# Patient Record
Sex: FEMALE | Race: BLACK OR AFRICAN AMERICAN | NOT HISPANIC OR LATINO | URBAN - METROPOLITAN AREA
[De-identification: names, ages, dates, MRNs, and addresses within clinical notes are randomized per-mention and may not be internally consistent; named-entity substitution may affect disease eponyms.]

---

## 2017-03-29 ENCOUNTER — OUTPATIENT (OUTPATIENT)
Dept: OUTPATIENT SERVICES | Facility: HOSPITAL | Age: 57
LOS: 1 days | Discharge: HOME | End: 2017-03-29

## 2017-06-27 DIAGNOSIS — M19.90 UNSPECIFIED OSTEOARTHRITIS, UNSPECIFIED SITE: ICD-10-CM

## 2018-01-13 ENCOUNTER — HOSPITAL ENCOUNTER (EMERGENCY)
Facility: HOSPITAL | Age: 58
End: 2018-01-13
Attending: EMERGENCY MEDICINE | Admitting: EMERGENCY MEDICINE
Payer: COMMERCIAL

## 2018-01-13 VITALS
SYSTOLIC BLOOD PRESSURE: 142 MMHG | RESPIRATION RATE: 16 BRPM | OXYGEN SATURATION: 99 % | HEART RATE: 82 BPM | WEIGHT: 183 LBS | TEMPERATURE: 97.8 F | DIASTOLIC BLOOD PRESSURE: 95 MMHG

## 2018-01-13 DIAGNOSIS — Z86.59 HISTORY OF COMMAND HALLUCINATIONS: ICD-10-CM

## 2018-01-13 DIAGNOSIS — F32.A DEPRESSION: Primary | ICD-10-CM

## 2018-01-13 LAB
ALBUMIN SERPL BCP-MCNC: 3.4 G/DL (ref 3.5–5)
ALP SERPL-CCNC: 98 U/L (ref 46–116)
ALT SERPL W P-5'-P-CCNC: 32 U/L (ref 12–78)
AMPHETAMINES SERPL QL SCN: NEGATIVE
ANION GAP SERPL CALCULATED.3IONS-SCNC: 7 MMOL/L (ref 4–13)
AST SERPL W P-5'-P-CCNC: 19 U/L (ref 5–45)
BACTERIA UR QL AUTO: ABNORMAL /HPF
BARBITURATES UR QL: NEGATIVE
BASOPHILS # BLD AUTO: 0.1 THOUSANDS/ΜL (ref 0–0.1)
BASOPHILS NFR BLD AUTO: 1 % (ref 0–1)
BENZODIAZ UR QL: NEGATIVE
BILIRUB SERPL-MCNC: 0.4 MG/DL (ref 0.2–1)
BILIRUB UR QL STRIP: NEGATIVE
BUN SERPL-MCNC: 12 MG/DL (ref 5–25)
CALCIUM SERPL-MCNC: 9.3 MG/DL (ref 8.3–10.1)
CHLORIDE SERPL-SCNC: 103 MMOL/L (ref 100–108)
CLARITY UR: CLEAR
CO2 SERPL-SCNC: 31 MMOL/L (ref 21–32)
COCAINE UR QL: NEGATIVE
COLOR UR: YELLOW
CREAT SERPL-MCNC: 1.24 MG/DL (ref 0.6–1.3)
EOSINOPHIL # BLD AUTO: 0.3 THOUSAND/ΜL (ref 0–0.61)
EOSINOPHIL NFR BLD AUTO: 3 % (ref 0–6)
ERYTHROCYTE [DISTWIDTH] IN BLOOD BY AUTOMATED COUNT: 13.5 % (ref 11.6–15.1)
ETHANOL EXG-MCNC: 0 MG/DL
GFR SERPL CREATININE-BSD FRML MDRD: 57 ML/MIN/1.73SQ M
GLUCOSE SERPL-MCNC: 124 MG/DL (ref 65–140)
GLUCOSE UR STRIP-MCNC: NEGATIVE MG/DL
HCT VFR BLD AUTO: 43.6 % (ref 34.8–46.1)
HGB BLD-MCNC: 14.7 G/DL (ref 11.5–15.4)
HGB UR QL STRIP.AUTO: ABNORMAL
KETONES UR STRIP-MCNC: NEGATIVE MG/DL
LEUKOCYTE ESTERASE UR QL STRIP: NEGATIVE
LYMPHOCYTES # BLD AUTO: 4.94 THOUSANDS/ΜL (ref 0.6–4.47)
LYMPHOCYTES NFR BLD AUTO: 43 % (ref 14–44)
MCH RBC QN AUTO: 29.4 PG (ref 26.8–34.3)
MCHC RBC AUTO-ENTMCNC: 33.7 G/DL (ref 31.4–37.4)
MCV RBC AUTO: 87 FL (ref 82–98)
METHADONE UR QL: NEGATIVE
MONOCYTES # BLD AUTO: 0.49 THOUSAND/ΜL (ref 0.17–1.22)
MONOCYTES NFR BLD AUTO: 4 % (ref 4–12)
NEUTROPHILS # BLD AUTO: 5.6 THOUSANDS/ΜL (ref 1.85–7.62)
NEUTS SEG NFR BLD AUTO: 49 % (ref 43–75)
NITRITE UR QL STRIP: NEGATIVE
NON-SQ EPI CELLS URNS QL MICRO: ABNORMAL /HPF
NRBC BLD AUTO-RTO: 0 /100 WBCS
OPIATES UR QL SCN: NEGATIVE
PCP UR QL: NEGATIVE
PH UR STRIP.AUTO: 5.5 [PH] (ref 4.5–8)
PLATELET # BLD AUTO: 366 THOUSANDS/UL (ref 149–390)
PMV BLD AUTO: 9.9 FL (ref 8.9–12.7)
POTASSIUM SERPL-SCNC: 4 MMOL/L (ref 3.5–5.3)
PROT SERPL-MCNC: 7.9 G/DL (ref 6.4–8.2)
PROT UR STRIP-MCNC: NEGATIVE MG/DL
RBC # BLD AUTO: 5 MILLION/UL (ref 3.81–5.12)
RBC #/AREA URNS AUTO: ABNORMAL /HPF
SODIUM SERPL-SCNC: 141 MMOL/L (ref 136–145)
SP GR UR STRIP.AUTO: 1.01 (ref 1–1.03)
T4 FREE SERPL-MCNC: 0.87 NG/DL (ref 0.76–1.46)
THC UR QL: NEGATIVE
TSH SERPL DL<=0.05 MIU/L-ACNC: 0.33 UIU/ML (ref 0.36–3.74)
UROBILINOGEN UR QL STRIP.AUTO: 0.2 E.U./DL
WBC # BLD AUTO: 11.43 THOUSAND/UL (ref 4.31–10.16)
WBC #/AREA URNS AUTO: ABNORMAL /HPF

## 2018-01-13 PROCEDURE — 84439 ASSAY OF FREE THYROXINE: CPT | Performed by: EMERGENCY MEDICINE

## 2018-01-13 PROCEDURE — 82075 ASSAY OF BREATH ETHANOL: CPT | Performed by: EMERGENCY MEDICINE

## 2018-01-13 PROCEDURE — 36415 COLL VENOUS BLD VENIPUNCTURE: CPT | Performed by: EMERGENCY MEDICINE

## 2018-01-13 PROCEDURE — 80053 COMPREHEN METABOLIC PANEL: CPT | Performed by: EMERGENCY MEDICINE

## 2018-01-13 PROCEDURE — 93005 ELECTROCARDIOGRAM TRACING: CPT

## 2018-01-13 PROCEDURE — 81001 URINALYSIS AUTO W/SCOPE: CPT

## 2018-01-13 PROCEDURE — 84443 ASSAY THYROID STIM HORMONE: CPT | Performed by: EMERGENCY MEDICINE

## 2018-01-13 PROCEDURE — 81002 URINALYSIS NONAUTO W/O SCOPE: CPT | Performed by: EMERGENCY MEDICINE

## 2018-01-13 PROCEDURE — 99285 EMERGENCY DEPT VISIT HI MDM: CPT

## 2018-01-13 PROCEDURE — 93005 ELECTROCARDIOGRAM TRACING: CPT | Performed by: EMERGENCY MEDICINE

## 2018-01-13 PROCEDURE — 85025 COMPLETE CBC W/AUTO DIFF WBC: CPT | Performed by: EMERGENCY MEDICINE

## 2018-01-13 PROCEDURE — 80307 DRUG TEST PRSMV CHEM ANLYZR: CPT | Performed by: EMERGENCY MEDICINE

## 2018-01-13 NOTE — ED NOTES
201 reviewed and signed by Pt for inpatient treatment  Dr Roxanna Granados is in agreement with treatment plan

## 2018-01-13 NOTE — ED NOTES
Pt has been accepted to [facility] by admissions Bettye and Delmer Morataya       S[place]  745 pm

## 2018-01-13 NOTE — ED NOTES
Chief Complaint   Patient presents with   [facility]     Pt  reports increased depression x months, recently moved here from [place]  Reports SI without a plan  Has been off meds x months, ran out of them  Denies HI/AH/VH  CW completed Behavioral health evaluation and Risk assessment for 56year old Female who  presents to the ED due to worsening depression and command AH to harm self, no specfied plan  Pt reports depression and AH have worsened past three days  Pt reports a recent argument with friend as a trigger for depression  Pt denies VH and HI  Hx of suicide attempts, last being at age 41: attmepted to turn on gas in home  Poor sleep and appetite  Pt resides with sister, moved two months ago from [facility], and is able to return home  No current mental health supports or medication  Last saw a psychiatrist 2 months ago in [facility]  Hx of cocaine abuse, last use 2 years ago  Pt denies current drug or alcohol use  Pt is in agreement to sign self in for inpatient treatment

## 2018-01-13 NOTE — ED PROVIDER NOTES
History  Chief Complaint   Patient presents with    Psychiatric Evaluation     Pt  reports increased depression x months, recently moved here from [place]  Reports SI without a plan  Has been off meds x months, ran out of them  Denies HI/AH/VH  Pt here for depression  Off her meds for a few months  Pt states sometimes she "doesn't want to be here but I wouldn't kill myself "  Pt is asking for outpatient resources, "I don't need to be admitted "        History provided by:  Patient   used: No    Psychiatric Evaluation   Presenting symptoms: depression    Presenting symptoms: no agitation, no hallucinations, no self-mutilation and no suicidal thoughts    Duration:  3 days  Timing:  Constant  Progression:  Unchanged  Chronicity:  Recurrent  Context: noncompliance    Context: not drug abuse    Treatment compliance:  Untreated  Relieved by:  None tried  Worsened by:  Nothing  Ineffective treatments:  None tried  Associated symptoms: no anxiety and no headaches        None       Past Medical History:   Diagnosis Date    Asthma     Depression     Psychosis        History reviewed  No pertinent surgical history  History reviewed  No pertinent family history  I have reviewed and agree with the history as documented  Social History   Substance Use Topics    Smoking status: Current Every Day Smoker    Smokeless tobacco: Never Used    Alcohol use No        Review of Systems   Constitutional: Negative for chills and fever  Eyes: Negative for visual disturbance  Gastrointestinal: Negative for diarrhea, nausea and vomiting  Skin: Negative for rash  Neurological: Negative for tremors, facial asymmetry, speech difficulty, weakness, numbness and headaches  Psychiatric/Behavioral: Negative for agitation, behavioral problems, confusion, decreased concentration, dysphoric mood, hallucinations, self-injury, sleep disturbance and suicidal ideas   The patient is not nervous/anxious and is not hyperactive  All other systems reviewed and are negative  Physical Exam  ED Triage Vitals [01/13/18 1053]   Temperature Pulse Respirations Blood Pressure SpO2   98 7 °F (37 1 °C) 89 18 (!) 180/99 97 %      Temp Source Heart Rate Source Patient Position - Orthostatic VS BP Location FiO2 (%)   Temporal -- -- -- --      Pain Score       No Pain           Orthostatic Vital Signs  Vitals:    01/13/18 1053   BP: (!) 180/99   Pulse: 89       Physical Exam   Constitutional: She is oriented to person, place, and time  She appears well-developed and well-nourished  No distress  HENT:   Head: Normocephalic  Eyes: EOM are normal    Neck: Normal range of motion  Neck supple  Cardiovascular: Normal rate, regular rhythm, normal heart sounds and intact distal pulses  Exam reveals no gallop and no friction rub  No murmur heard  Pulmonary/Chest: Effort normal and breath sounds normal  No respiratory distress  She has no wheezes  She has no rales  Abdominal: Soft  Bowel sounds are normal  She exhibits no distension  There is no tenderness  There is no rebound and no guarding  Neurological: She is alert and oriented to person, place, and time  Skin: Skin is warm and dry  No pallor  Psychiatric: Her affect is not angry, not blunt, not labile and not inappropriate  Her speech is not rapid and/or pressured and not slurred  She is not agitated, not aggressive, not hyperactive and not slowed  She exhibits a depressed mood  She expresses no homicidal ideation  She expresses no suicidal plans and no homicidal plans  Tearful, pleasant   Nursing note and vitals reviewed        ED Medications  Medications - No data to display    Diagnostic Studies  Results Reviewed     Procedure Component Value Units Date/Time    Urine Microscopic [[id number]]  (Abnormal) Collected:  01/13/18 1532    Lab Status:  Final result Specimen:  Urine from Urine, Clean Catch Updated:  01/13/18 1554     RBC, UA 2-4 (A) /hpf      WBC, UA None Seen /hpf      Epithelial Cells Occasional /hpf      Bacteria, UA Occasional /hpf     POCT urinalysis dipstick [[id number]]  (Abnormal) Resulted:  01/13/18 1532    Lab Status:  Final result Specimen:  Urine Updated:  01/13/18 1537    ED Urine Macroscopic [[id number]]  (Abnormal) Collected:  01/13/18 1532    Lab Status:  Final result Specimen:  Urine Updated:  01/13/18 1533     Color, UA Yellow     Clarity, UA Clear     pH, UA 5 5     Leukocytes, UA Negative     Nitrite, UA Negative     Protein, UA Negative mg/dl      Glucose, UA Negative mg/dl      Ketones, UA Negative mg/dl      Urobilinogen, UA 0 2 E U /dl      Bilirubin, UA Negative     Blood, UA Moderate (A)     Specific [place], UA 1 015    Narrative:       CLINITEK RESULT    TSH, 3rd generation with T4 reflex [[id number]]  (Abnormal) Collected:  01/13/18 1414    Lab Status:  Final result Specimen:  Blood from Arm, Left Updated:  01/13/18 1445     TSH 3RD GENERATON 0 326 (L) uIU/mL     Narrative:         Patients undergoing fluorescein dye angiography may retain small amounts of fluorescein in the body for 48-72 hours post procedure  Samples containing fluorescein can produce falsely depressed TSH values  If the patient had this procedure,a specimen should be resubmitted post fluorescein clearance  The recommended reference ranges for TSH during pregnancy are as follows:  First trimester 0 1 to 2 5 uIU/mL  Second trimester  0 2 to 3 0 uIU/mL  Third trimester 0 3 to 3 0 uIU/m      T4, free [[id number]] Collected:  01/13/18 1414    Lab Status:   In process Specimen:  Blood from Arm, Left Updated:  01/13/18 1445    Comprehensive metabolic panel [[id number]]  (Abnormal) Collected:  01/13/18 1414    Lab Status:  Final result Specimen:  Blood from Arm, Left Updated:  01/13/18 1441     Sodium 141 mmol/L      Potassium 4 0 mmol/L      Chloride 103 mmol/L      CO2 31 mmol/L      Anion Gap 7 mmol/L      BUN 12 mg/dL      Creatinine 1 24 mg/dL      Glucose 124 mg/dL      Calcium 9 3 mg/dL      AST 19 U/L      ALT 32 U/L      Alkaline Phosphatase 98 U/L      Total Protein 7 9 g/dL      Albumin 3 4 (L) g/dL      Total Bilirubin 0 40 mg/dL      eGFR 57 ml/min/1 73sq m     Narrative:         National Kidney Disease Education Program recommendations are as follows:  GFR calculation is accurate only with a steady state creatinine  Chronic Kidney disease less than 60 ml/min/1 73 sq  meters  Kidney failure less than 15 ml/min/1 73 sq  meters  CBC and differential [[id number]]  (Abnormal) Collected:  01/13/18 1414    Lab Status:  Final result Specimen:  Blood from Arm, Left Updated:  01/13/18 1421     WBC 11 43 (H) Thousand/uL      RBC 5 00 Million/uL      Hemoglobin 14 7 g/dL      Hematocrit 43 6 %      MCV 87 fL      MCH 29 4 pg      MCHC 33 7 g/dL      RDW 13 5 %      MPV 9 9 fL      Platelets 178 Thousands/uL      nRBC 0 /100 WBCs      Neutrophils Relative 49 %      Lymphocytes Relative 43 %      Monocytes Relative 4 %      Eosinophils Relative 3 %      Basophils Relative 1 %      Neutrophils Absolute 5 60 Thousands/µL      Lymphocytes Absolute 4 94 (H) Thousands/µL      Monocytes Absolute 0 49 Thousand/µL      Eosinophils Absolute 0 30 Thousand/µL      Basophils Absolute 0 10 Thousands/µL     Rapid drug screen, urine [[id number]]  (Normal) Collected:  01/13/18 1127    Lab Status:  Final result Specimen:  Urine from Urine, Clean Catch Updated:  01/13/18 1150     Amph/Meth UR Negative     Barbiturate Ur Negative     Benzodiazepine Urine Negative     Cocaine Urine Negative     Methadone Urine Negative     Opiate Urine Negative     PCP Ur Negative     THC Urine Negative    Narrative:         FOR MEDICAL PURPOSES ONLY  IF CONFIRMATION NEEDED PLEASE CONTACT THE LAB WITHIN 5 DAYS      Drug Screen Cutoff Levels:  AMPHETAMINE/METHAMPHETAMINES  1000 ng/mL  BARBITURATES     200 ng/mL  BENZODIAZEPINES     200 ng/mL  COCAINE      300 ng/mL  METHADONE      300 ng/mL  OPIATES      300 ng/mL  PHENCYCLIDINE     25 ng/mL  THC       50 ng/mL    POCT alcohol breath test [[id number]]  (Normal) Resulted:  01/13/18 1125    Lab Status:  Final result Updated:  01/13/18 1125     EXTBreath Alcohol 0 000                 No orders to display              Procedures  ECG 12 Lead Documentation  Date/Time: 1/13/2018 2:29 PM  Performed by: Lisette De León by: Linwood Owsald     Indications / Diagnosis:  Psych eval  ECG reviewed by me, the ED Provider: yes    Patient location:  Bedside  Rate:     ECG rate:  74    ECG rate assessment: normal    Rhythm:     Rhythm: sinus rhythm    Ectopy:     Ectopy: none    QRS:     QRS intervals:  Normal  ST segments:     ST segments:  Normal  T waves:     T waves: normal             Phone Contacts  ED Phone Contact    ED Course  ED Course as of Jan 13 1557   Sat Jan 13, 2018   1403 Pt now admitting to crisis worker that she is having command hallucinations, which she denies to me during my initial assessment  Pt signed 201 and anson-psych labs ordered  MDM  Number of Diagnoses or Management Options  Diagnosis management comments: Depression - Crisis c/s         Amount and/or Complexity of Data Reviewed  Tests in the medicine section of CPT®: reviewed and ordered      CritCare Time    Disposition  Final diagnoses:   Depression   History of command hallucinations     Time reflects when diagnosis was documented in both MDM as applicable and the Disposition within this note     Time User Action Codes Description Comment    1/13/2018  1:02 PM Wilberto Sewell [F32 9] Depression     1/13/2018  3:49 PM Wilberto Sewell [Z86 59] History of command hallucinations       ED Disposition     ED Disposition Condition Comment    Transfer to [address] [place] [address] Most Recent Value   Patient Condition  The patient has been stabilized such that within reasonable medical probability, no material deterioration of the patient condition or the condition of the unborn child(leslie) is likely to result from the transfer   Reason for Transfer  Level of Care needed not available at this facility   Benefits of Transfer  Specialized equipment and/or services available at the receiving facility (Include comment)________________________   Risks of Transfer  Potential for delay in receiving treatment   Sending MD Dr Jorje El   Provider Certification  General risk, such as traffic hazards, adverse weather conditions, rough terrain or turbulence, possible failure of equipment (including vehicle or aircraft), or consequences of actions of persons outside the control of the transport personnel      Follow-up Information    None       Patient's Medications    No medications on file     No discharge procedures on file      ED Provider  Electronically Signed by           Escobar Burrows 26, DO  01/13/18 7305

## 2018-01-13 NOTE — ED NOTES
PC with Guille Cedillo, [facility], confirmed a bed is available on adult unit, and will review if Pt is in agreement  Pt confirmed willingness for treat at [facility] adult unit  201 and clinical faxed to [facility]

## 2018-01-13 NOTE — ED NOTES
Phone call with Emanuel Carrillo [facility] [phone], authorized for 3 days IP tx, pending admission  They are aware [place] is reviewing, asked to be notified if [facility] does not accept Pt  Phone call with Jane Hobson, received fax, and will follow up with a disposition

## 2018-01-13 NOTE — EMTALA/ACUTE CARE TRANSFER
[facility] [address] [place] [id number]  Dept: [phone]      EMTALA TRANSFER CONSENT    NAME Miles Bass                                         1960                              MRN [id number]    I have been informed of my rights regarding examination, treatment, and transfer   by Dr Maggie Thapa DO    Benefits: Specialized equipment and/or services available at the receiving facility (Include comment)________________________    Risks: Potential for delay in receiving treatment      Transfer Request   I acknowledge that my medical condition has been evaluated and explained to me by the emergency department physician or other qualified medical person and/or my attending physician who has recommended and offered to me further medical examination and treatment  I understand the Hospital's obligation with respect to the treatment and stabilization of my emergency medical condition  I nevertheless request to be transferred  I release the Hospital, the doctor, and any other persons caring for me from all responsibility or liability for any injury or ill effects that may result from my transfer and agree to accept all responsibility for the consequences of my choice to transfer, rather than receive stabilizing treatment at the Hospital  I understand that because the transfer is my request, my insurance may not provide reimbursement for the services  The Hospital will assist and direct me and my family in how to make arrangements for transfer, but the hospital is not liable for any fees charged by the transport service  In spite of this understanding, I refuse to consent to further medical examination and treatment which has been offered to me, and request transfer to    I authorize the performance of emergency medical procedures and treatments upon me in both transit and upon arrival at the receiving facility    Additionally, I authorize the release of any and all medical records to the receiving facility and request they be transported with me, if possible  I authorize the performance of emergency medical procedures and treatments upon me in both transit and upon arrival at the receiving facility  Additionally, I authorize the release of any and all medical records to the receiving facility and request they be transported with me, if possible  I understand that the safest mode of transportation during a medical emergency is an ambulance and that the Hospital advocates the use of this mode of transport  Risks of traveling to the receiving facility by car, including absence of medical control, life sustaining equipment, such as oxygen, and medical personnel has been explained to me and I fully understand them  (EDELMIRA CORRECT BOX BELOW)  [  ]  I consent to the stated transfer and to be transported by ambulance/helicopter  [  ]  I consent to the stated transfer, but refuse transportation by ambulance and accept full responsibility for my transportation by car  I understand the risks of non-ambulance transfers and I exonerate the Hospital and its staff from any deterioration in my condition that results from this refusal     X___________________________________________    DATE  18  TIME________  Signature of patient or legally responsible individual signing on patient behalf           RELATIONSHIP TO PATIENT_________________________          Provider Aria Barros 1266                                         1960                              MRN [id number]    A medical screening exam was performed on the above named patient  Based on the examination:    Condition Necessitating Transfer The encounter diagnosis was Depression      Patient Condition: The patient has been stabilized such that within reasonable medical probability, no material deterioration of the patient condition or the condition of the unborn child(leslie) is likely to result from the transfer    Reason for Transfer: Level of Care needed not available at this facility    Transfer Requirements: Facility     · Space available and qualified personnel available for treatment as acknowledged by    · Agreed to accept transfer and to provide appropriate medical treatment as acknowledged by          · Appropriate medical records of the examination and treatment of the patient are provided at the time of transfer   [address] _______  · Transfer will be performed by qualified personnel from    and appropriate transfer equipment as required, including the use of necessary and appropriate life support measures  Provider Certification: I have examined the patient and explained the following risks and benefits of being transferred/refusing transfer to the patient/family:  General risk, such as traffic hazards, adverse weather conditions, rough terrain or turbulence, possible failure of equipment (including vehicle or aircraft), or consequences of actions of persons outside the control of the transport personnel      Based on these reasonable risks and benefits to the patient and/or the unborn child(leslie), and based upon the information available at the time of the patients examination, I certify that the medical benefits reasonably to be expected from the provision of appropriate medical treatments at another medical facility outweigh the increasing risks, if any, to the individuals medical condition, and in the case of labor to the unborn child, from effecting the transfer      X____________________________________________ DATE 01/13/18        TIME_______      ORIGINAL - SEND TO MEDICAL RECORDS   COPY - SEND WITH PATIENT DURING TRANSFER

## 2018-01-14 NOTE — ED NOTES
[facility]JI called to change transport schedule for psychiatric patients, ETA for  for this pt now 1945        Jayleen Casey, CORRINA  01/13/18 7767

## 2018-01-14 NOTE — ED NOTES
Lizzy called to confirm pt's placement  Advised caller that pt expected to go to [facility] with transport at about [address]        Fabrice Johns RN  01/13/18 3835

## 2018-01-14 NOTE — ED NOTES
[facility] here to transport pt to [address] [place] [address], spoke with Kareen Lopez and gave update on pt's transfer  Pt to go to room 379-1        Kaleigh Pennington RN  01/13/18 2025

## 2018-01-15 LAB
ATRIAL RATE: 74 BPM
P AXIS: 53 DEGREES
PR INTERVAL: 196 MS
QRS AXIS: -21 DEGREES
QRSD INTERVAL: 94 MS
QT INTERVAL: 384 MS
QTC INTERVAL: 426 MS
T WAVE AXIS: 24 DEGREES
VENTRICULAR RATE: 74 BPM

## 2018-04-17 ENCOUNTER — HOSPITAL ENCOUNTER (EMERGENCY)
Facility: HOSPITAL | Age: 58
Discharge: HOME | End: 2018-04-17
Admitting: EMERGENCY MEDICINE
Payer: COMMERCIAL

## 2018-04-17 VITALS
TEMPERATURE: 97.3 F | SYSTOLIC BLOOD PRESSURE: 173 MMHG | RESPIRATION RATE: 18 BRPM | DIASTOLIC BLOOD PRESSURE: 91 MMHG | HEART RATE: 71 BPM | OXYGEN SATURATION: 99 % | WEIGHT: 158.8 LBS

## 2018-04-17 DIAGNOSIS — M54.50 ACUTE EXACERBATION OF CHRONIC LOW BACK PAIN: Primary | ICD-10-CM

## 2018-04-17 DIAGNOSIS — G89.29 ACUTE EXACERBATION OF CHRONIC LOW BACK PAIN: Primary | ICD-10-CM

## 2018-04-17 PROCEDURE — 96372 THER/PROPH/DIAG INJ SC/IM: CPT

## 2018-04-17 PROCEDURE — 99283 EMERGENCY DEPT VISIT LOW MDM: CPT

## 2018-04-17 RX ORDER — METHOCARBAMOL 500 MG/1
500 TABLET, FILM COATED ORAL 4 TIMES DAILY
Qty: 40 TABLET | Refills: 0 | Status: SHIPPED | OUTPATIENT
Start: 2018-04-17 | End: 2018-04-27

## 2018-04-17 RX ORDER — LIDOCAINE 50 MG/G
1 PATCH TOPICAL ONCE
Status: DISCONTINUED | OUTPATIENT
Start: 2018-04-17 | End: 2018-04-17 | Stop reason: HOSPADM

## 2018-04-17 RX ORDER — LIDOCAINE 50 MG/G
1 PATCH TOPICAL DAILY
Qty: 30 PATCH | Refills: 0 | Status: SHIPPED | OUTPATIENT
Start: 2018-04-17

## 2018-04-17 RX ORDER — NAPROXEN 500 MG/1
500 TABLET ORAL 2 TIMES DAILY WITH MEALS
Qty: 60 TABLET | Refills: 0 | Status: SHIPPED | OUTPATIENT
Start: 2018-04-17 | End: 2019-04-17

## 2018-04-17 RX ORDER — KETOROLAC TROMETHAMINE 30 MG/ML
15 INJECTION, SOLUTION INTRAMUSCULAR; INTRAVENOUS ONCE
Status: COMPLETED | OUTPATIENT
Start: 2018-04-17 | End: 2018-04-17

## 2018-04-17 RX ORDER — FLUOXETINE HYDROCHLORIDE 40 MG/1
40 CAPSULE ORAL DAILY
COMMUNITY

## 2018-04-17 RX ADMIN — LIDOCAINE 1 PATCH: 50 PATCH TOPICAL at 16:01

## 2018-04-17 RX ADMIN — KETOROLAC TROMETHAMINE 15 MG: 30 INJECTION, SOLUTION INTRAMUSCULAR at 16:07

## 2018-04-17 NOTE — ED PROVIDER NOTES
History  Chief Complaint   Patient presents with    Back Pain     Pt reports loer back pain radiating to LLE, worsening x5 days "I've had it for years, in [place] they used to give me the shots in my back, but I've been out her for 7 years", denies injury, denies loss of bowel/bladder function  Patient presents the emergency department with lower back pain primarily on the left side  Patient has a long history of back problems she recently moved from [place]  She states that she is to follow up regularly for injections in back that kept her symptoms under control  She moved here about 6 months ago and her last injection was about 8 months ago  Pain is been gradually getting worse over the past couple days it has been worse  Patient states this feels similar to her prior back problems  She reports pain and numbness into her left leg this is not new she states that this happens when she gets flare ups  No change in bowel or bladder no abdominal pain no blood in her urine or urinary symptoms  Patient has not taken anything pain  She states since I have moved  I have anything to take from a back  She states she has an appointment to see somebody but not until May 21st             Prior to Admission Medications   Prescriptions Last Dose Informant Patient Reported? Taking? FLUoxetine (PROzac) 40 MG capsule   Yes Yes   Sig: Take 40 mg by mouth daily   nefazodone (SERZONE) 100 mg tablet   Yes Yes   Sig: Take 100 mg by mouth 2 (two) times a day      Facility-Administered Medications: None       Past Medical History:   Diagnosis Date    Asthma     [facility]     Depression     Psychosis     Schizo affective schizophrenia ([facility] )        History reviewed  No pertinent surgical history  History reviewed  No pertinent family history  I have reviewed and agree with the history as documented      Social History   Substance Use Topics    Smoking status: Current Every Day Smoker Packs/day: 0 20     Types: Cigarettes    Smokeless tobacco: Never Used    Alcohol use No        Review of Systems   All other systems reviewed and are negative  Physical Exam  ED Triage Vitals   Temperature Pulse Respirations Blood Pressure SpO2   04/17/18 1528 04/17/18 1530 04/17/18 1530 04/17/18 1530 04/17/18 1530   (!) 97 3 °F (36 3 °C) 71 18 (!) 155/101 99 %      Temp Source Heart Rate Source Patient Position - Orthostatic VS BP Location FiO2 (%)   04/17/18 1528 04/17/18 1530 04/17/18 1530 04/17/18 1530 --   Oral Monitor Sitting Right arm       Pain Score       04/17/18 1530       8           Orthostatic Vital Signs  Vitals:    04/17/18 1530 04/17/18 1608   BP: (!) 155/101 (!) 173/91   Pulse: 71    Patient Position - Orthostatic VS: Sitting        Physical Exam   Constitutional: She appears well-developed and well-nourished  HENT:   Head: Normocephalic  Mouth/Throat: Oropharynx is clear and moist    Eyes: Conjunctivae and EOM are normal    Neck: Neck supple  Cardiovascular: Normal rate, regular rhythm and normal heart sounds  Pulmonary/Chest: Effort normal and breath sounds normal    Abdominal: Soft  Bowel sounds are normal    No CVA tenderness   Neurological: She is alert  Skin: Skin is warm  Psychiatric: She has a normal mood and affect  Her behavior is normal    Nursing note and vitals reviewed        ED Medications  Medications   lidocaine (LIDODERM) 5 % patch 1 patch (1 patch Transdermal Medication Applied 4/17/18 1601)   ketorolac (TORADOL) injection 15 mg (15 mg Intramuscular Given 4/17/18 1607)       Diagnostic Studies  Results Reviewed     None                 No orders to display              Procedures  Procedures       Phone Contacts  ED Phone Contact    ED Course  ED Course                                MDM  Number of Diagnoses or Management Options  Acute exacerbation of chronic low back pain: new and does not require workup  Risk of Complications, Morbidity, and/or Mortality  General comments: Instructions reviewed with patient answered questions  Patient Progress  Patient progress: improved    CritCare Time    Disposition  Final diagnoses:   Acute exacerbation of chronic low back pain     Time reflects when diagnosis was documented in both MDM as applicable and the Disposition within this note     Time User Action Codes Description Comment    4/17/2018  3:53 PM Madie Gomez [M54 5,  G89 29] Acute exacerbation of chronic low back pain       ED Disposition     ED Disposition Condition Comment    Discharge  [facility] discharge to home/self care  Condition at discharge: Good        Follow-up Information     Follow up With Specialties Details Why Contact Info    [facility]    [address] [address] [phone]        Patient's Medications   Discharge Prescriptions    LIDOCAINE (LIDODERM) 5 %    Place 1 patch on the skin daily Remove & Discard patch within 12 hours or as directed by MD       Start Date: 4/17/2018 End Date: --       Order Dose: 1 patch       Quantity: 30 patch    Refills: 0    METHOCARBAMOL (ROBAXIN) 500 MG TABLET    Take 1 tablet (500 mg total) by mouth 4 (four) times a day for 10 days       Start Date: 4/17/2018 End Date: 4/27/2018       Order Dose: 500 mg       Quantity: 40 tablet    Refills: 0    NAPROXEN (EC NAPROSYN) 500 MG EC TABLET    Take 1 tablet (500 mg total) by mouth 2 (two) times a day with meals       Start Date: 4/17/2018 End Date: 4/17/2019       Order Dose: 500 mg       Quantity: 60 tablet    Refills: 0     No discharge procedures on file      ED Provider  Electronically Signed by           Mami Bashir PA-C  04/17/18 0496

## 2018-04-17 NOTE — DISCHARGE INSTRUCTIONS
Warm compresses to the area  Medication as directed  FU with your family doctor, return to the ED for worsening symptoms  Acute Low Back Pain, Ambulatory Care   GENERAL INFORMATION:   Acute low back pain  is discomfort in your lower back area that lasts for less than 12 weeks  The word acute is used to describe pain that starts suddenly, worsens quickly, and lasts for a short time  Common symptoms include the following:   · Back stiffness or spasms    · Pain down the back or side of one leg    · Holding yourself in an unusual position or posture to decrease your back pain    · Not being able to find a sitting position that is comfortable    · Slow increase in your pain for 24 to 48 hours after you stress your back    · Tenderness on your lower back or severe pain when you move your back  Seek immediate care for the following symptoms:   · Severe pain    · Sudden stiffness and heaviness in both buttocks down to both legs    · Numbness or weakness in one leg, or pain in both legs    · Numbness in your genital area or across your lower back    · Unable to control your urine or bowel movements  Treatment for acute low back pain  may include any of the following:  · Medicines:      ¨ NSAIDs  help decrease swelling and pain or fever  This medicine is available with or without a doctor's order  NSAIDs can cause stomach bleeding or kidney problems in certain people  If you take blood thinner medicine, always ask your healthcare provider if NSAIDs are safe for you  Always read the medicine label and follow directions  ¨ Muscle relaxers  help decrease muscle spasms pain  ¨ Prescription pain medicine  may be given  Ask how to take this medicine safely  · Surgery  may be needed if your pain is severe and other treatments do not work  Surgery may be needed for conditions of the lumbar spine, such as herniated disc or spinal stenosis  Manage your symptoms:   · Sleep on a firm mattress    If you do not have a firm mattress, have someone move your mattress to the floor for a few days  A piece of plywood under your mattress can also help make it firmer  · Apply ice  on your lower back for 15 to 20 minutes every hour or as directed  Use an ice pack, or put crushed ice in a plastic bag  Cover it with a towel  Ice helps prevent tissue damage and decreases swelling and pain  You can alternate ice and heat  · Apply heat  on your lower back for 20 to 30 minutes every 2 hours for as many days as directed  Heat helps decrease pain and muscle spasms  · Go to physical therapy  A physical therapist teaches you exercises to help improve movement and strength, and to decrease pain  Prevent acute low back pain:   · Use proper body mechanics  ¨ Bend at the hips and knees when you  objects  Do not bend from the waist  Use your leg muscles as you lift the load  Do not use your back  Keep the object close to your chest as you lift it  Try not to twist or lift anything above your waist     ¨ Change your position often when you stand for long periods of time  Rest one foot on a small box or footrest, and then switch to the other foot often  ¨ Try not to sit for long periods of time  When you do, sit in a straight-backed chair with your feet flat on the floor  Never reach, pull, or push while you are sitting  · Exercise regularly  Warm up before you exercise  Do exercises that strengthen your back muscles  Ask about the best exercise plan for you  · Maintain a healthy weight  Ask your healthcare provider how much you should weigh  Ask him to help you create a weight loss plan if you are overweight  Follow up with your healthcare provider as directed:  Return for a follow-up visit if you still have pain after 1 to 3 weeks of treatment  You may need to visit an orthopedist if your back pain lasts more than 6 to 12 weeks  Write down your questions so you remember to ask them during your visits    CARE AGREEMENT:   You have the right to help plan your care  Learn about your health condition and how it may be treated  Discuss treatment options with your caregivers to decide what care you want to receive  You always have the right to refuse treatment  The above information is an  only  It is not intended as medical advice for individual conditions or treatments  Talk to your doctor, nurse or pharmacist before following any medical regimen to see if it is safe and effective for you  © 2014 [address] is for End User's use only and may not be sold, redistributed or otherwise used for commercial purposes  All illustrations and images included in CareNotes® are the copyrighted property of [employer]  or Kwame Villatoro

## 2021-09-12 ENCOUNTER — EMERGENCY (EMERGENCY)
Facility: HOSPITAL | Age: 61
LOS: 0 days | Discharge: HOME | End: 2021-09-12
Attending: EMERGENCY MEDICINE | Admitting: EMERGENCY MEDICINE
Payer: MEDICAID

## 2021-09-12 VITALS
SYSTOLIC BLOOD PRESSURE: 143 MMHG | WEIGHT: 160.06 LBS | TEMPERATURE: 99 F | RESPIRATION RATE: 17 BRPM | HEART RATE: 71 BPM | OXYGEN SATURATION: 95 % | DIASTOLIC BLOOD PRESSURE: 81 MMHG

## 2021-09-12 DIAGNOSIS — J45.909 UNSPECIFIED ASTHMA, UNCOMPLICATED: ICD-10-CM

## 2021-09-12 DIAGNOSIS — M25.572 PAIN IN LEFT ANKLE AND JOINTS OF LEFT FOOT: ICD-10-CM

## 2021-09-12 DIAGNOSIS — R26.2 DIFFICULTY IN WALKING, NOT ELSEWHERE CLASSIFIED: ICD-10-CM

## 2021-09-12 DIAGNOSIS — I10 ESSENTIAL (PRIMARY) HYPERTENSION: ICD-10-CM
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
## 2021-09-12 PROCEDURE — 29515 APPLICATION SHORT LEG SPLINT: CPT

## 2021-09-12 PROCEDURE — 73590 X-RAY EXAM OF LOWER LEG: CPT | Mod: 26,LT

## 2021-09-12 PROCEDURE — 73610 X-RAY EXAM OF ANKLE: CPT | Mod: 26,LT

## 2021-09-12 PROCEDURE — 99284 EMERGENCY DEPT VISIT MOD MDM: CPT | Mod: 25

## 2021-09-12 NOTE — ED PROVIDER NOTE - PHYSICAL EXAMINATION
Physical Exam    Vital Signs: I have reviewed the initial vital signs.  Constitutional: well-nourished, appears stated age, no acute distress,   Musculoskeletal: supple neck, no lower extremity edema, slightly painful ankle flexion extension , demonstrating full rom, no gross bony deformities or swelling. TTP along distal posterior tip of lateral malleoli and superiorly along fibula. No 5th metatarsal or navicular tenderness  Integumentary: warm, dry, no rashes, lacerations,  Neurologic: awake, alert, no foot drop, no paresthesias

## 2021-09-12 NOTE — ED PROVIDER NOTE - CLINICAL SUMMARY MEDICAL DECISION MAKING FREE TEXT BOX
62 yo female PMH asthma c/o left ankle pain after a twisting injury this AM, now reports pain on ambulation,  Pain is non-radiating, denies any paresthesias or any other additional complaints/injuries.  Well-appearing female in NAD, nml visual inspection of the injured extremity, painless ROM of the knee and hip, no ttp of the proximal fibula, +ttp of  b/l malleoli, limited ROM of the ankle due to pain, neurovascularly intact, no calf ttp, no Achilles tending injury.  X-ray appears negative for acute osseous pathology, splint was applied, advised to follow up with ortho, strict return precautions given,  patient verbalized understanding  and is amenable with the plan.

## 2021-09-12 NOTE — ED PROVIDER NOTE - ATTENDING CONTRIBUTION TO CARE
60 yo female PMH asthma c/o left ankle pain after a twisting injury this AM, now reports pain on ambulation,  Pain is non-radiating, denies any paresthesias or any other additional complaints/injuries.  Well-appearing female in NAD, nml visual inspection of the injured extremity, painless ROM of the knee and hip, no ttp of the proximal fibula, +ttp of  b/l malleoli, limited ROM of the ankle due to pain, neurovascularly intact, no calf ttp, no Achilles tending injury.  X-ray appears negative for acute osseous pathology, splint was applied, advised to follow up with ortho, strict return precautions given,  patient verbalized understanding  and is amenable with the plan.

## 2021-09-12 NOTE — ED PROVIDER NOTE - NSFOLLOWUPINSTRUCTIONS_ED_ALL_ED_FT
Ankle Pain    The ankle joint holds your body weight and allows you to move around. Ankle pain can occur on either side or the back of one ankle or both ankles. Ankle pain may be sharp and burning or dull and aching. There may be tenderness, stiffness, redness, or warmth around the ankle. Many things can cause ankle pain, including an injury to the area and overuse of the ankle.  Follow these instructions at home:    Activity     Rest your ankle as told by your health care provider. Avoid any activities that cause ankle pain.Do not use the injured limb to support your body weight until your health care provider says that you can. Use crutches as told by your health care provider.Do exercises as told by your health care provider.Ask your health care provider when it is safe to drive if you have a brace on your ankle.If you have a brace:     Wear the brace as told by your health care provider. Remove it only as told by your health care provider.Loosen the brace if your toes tingle, become numb, or turn cold and blue.Keep the brace clean.If the brace is not waterproof:  Do not let it get wet.Cover it with a watertight covering when you take a bath or shower.If you were given an elastic bandage:        Remove it when you take a bath or a shower.Try not to move your ankle very much, but wiggle your toes from time to time. This helps to prevent swelling.Adjust the bandage to make it more comfortable if it feels too tight.Loosen the bandage if you have numbness or tingling in your foot or if your foot turns cold and blue.Managing pain, stiffness, and swelling       If directed, put ice on the painful area.  If you have a removable brace or elastic bandage, remove it as told by your health care provider.Put ice in a plastic bag.Place a towel between your skin and the bag.Leave the ice on for 20 minutes, 2–3 times a day.Move your toes often to avoid stiffness and to lessen swelling.Raise (elevate) your ankle above the level of your heart while you are sitting or lying down.General instructions     Record information about your pain. Writing down the following may be helpful for you and your health care provider:  How often you have ankle pain.Where the pain is located.What the pain feels like.If treatment involves wearing a prescribed shoe or insole, make sure you wear it correctly and for as long as told by your health care provider.Take over-the-counter and prescription medicines only as told by your health care provider.Keep all follow-up visits as told by your health care provider. This is important.Contact a health care provider if:  Your pain gets worse.Your pain is not relieved with medicines.You have a fever or chills.You are having more trouble with walking.You have new symptoms.Get help right away if:  Your foot, leg, toes, or ankle:  Tingles or becomes numb.Becomes swollen.Turns pale or blue.Summary  Ankle pain can occur on either side or the back of one ankle or both ankles.Ankle pain may be sharp and burning or dull and aching.Rest your ankle as told by your health care provider. If told, apply ice to the area.Take over-the-counter and prescription medicines only as told by your health care provider.This information is not intended to replace advice given to you by your health care provider. Make sure you discuss any questions you have with your health care provider.    Document Released: 06/07/2011 Document Revised: 06/26/2019 Document Reviewed: 06/26/2019  [employer] Interactive Patient Education © 2019 [employer]. Make an appointment with the orthopedist.     Ankle Pain    The ankle joint holds your body weight and allows you to move around. Ankle pain can occur on either side or the back of one ankle or both ankles. Ankle pain may be sharp and burning or dull and aching. There may be tenderness, stiffness, redness, or warmth around the ankle. Many things can cause ankle pain, including an injury to the area and overuse of the ankle.  Follow these instructions at home:    Activity     Rest your ankle as told by your health care provider. Avoid any activities that cause ankle pain.Do not use the injured limb to support your body weight until your health care provider says that you can. Use crutches as told by your health care provider.Do exercises as told by your health care provider.Ask your health care provider when it is safe to drive if you have a brace on your ankle.If you have a brace:     Wear the brace as told by your health care provider. Remove it only as told by your health care provider.Loosen the brace if your toes tingle, become numb, or turn cold and blue.Keep the brace clean.If the brace is not waterproof:  Do not let it get wet.Cover it with a watertight covering when you take a bath or shower.If you were given an elastic bandage:        Remove it when you take a bath or a shower.Try not to move your ankle very much, but wiggle your toes from time to time. This helps to prevent swelling.Adjust the bandage to make it more comfortable if it feels too tight.Loosen the bandage if you have numbness or tingling in your foot or if your foot turns cold and blue.Managing pain, stiffness, and swelling       If directed, put ice on the painful area.  If you have a removable brace or elastic bandage, remove it as told by your health care provider.Put ice in a plastic bag.Place a towel between your skin and the bag.Leave the ice on for 20 minutes, 2–3 times a day.Move your toes often to avoid stiffness and to lessen swelling.Raise (elevate) your ankle above the level of your heart while you are sitting or lying down.General instructions     Record information about your pain. Writing down the following may be helpful for you and your health care provider:  How often you have ankle pain.Where the pain is located.What the pain feels like.If treatment involves wearing a prescribed shoe or insole, make sure you wear it correctly and for as long as told by your health care provider.Take over-the-counter and prescription medicines only as told by your health care provider.Keep all follow-up visits as told by your health care provider. This is important.Contact a health care provider if:  Your pain gets worse.Your pain is not relieved with medicines.You have a fever or chills.You are having more trouble with walking.You have new symptoms.Get help right away if:  Your foot, leg, toes, or ankle:  Tingles or becomes numb.Becomes swollen.Turns pale or blue.Summary  Ankle pain can occur on either side or the back of one ankle or both ankles.Ankle pain may be sharp and burning or dull and aching.Rest your ankle as told by your health care provider. If told, apply ice to the area.Take over-the-counter and prescription medicines only as told by your health care provider.This information is not intended to replace advice given to you by your health care provider. Make sure you discuss any questions you have with your health care provider.    Document Released: 06/07/2011 Document Revised: 06/26/2019 Document Reviewed: 06/26/2019  [employer] Interactive Patient Education © 2019 [employer]

## 2021-09-12 NOTE — ED PROVIDER NOTE - NS ED ROS FT
Constitutional: (-) fever (-) chills (-) (-) lightheadedness   Musculoskeletal: (-) neck pain, (-) back pain, (+) joint pain (-) joint swelling (+ painful ROM  Integumentary: (-) rash, (-) edema (-) lacerations (-) pruritis   Neurological: (-) headache, (-) altered mental status (-) LOC (-) dizziness (-) paresthesias (-) gait abnormalities

## 2021-09-12 NOTE — ED PROVIDER NOTE - PATIENT PORTAL LINK FT
You can access the FollowMyHealth Patient Portal offered by [facility] by registering at the following website: [email or web address]. By joining [employer]’s FollowMyHealth portal, you will also be able to view your health information using other applications (apps) compatible with our system.

## 2021-09-12 NOTE — ED PROVIDER NOTE - PROGRESS NOTE DETAILS
dc: declines motrin tylenol , is comfortable w ice and elevation. Plan for xray ankle and tibfib. X-ray is negative for acute fx or dislocation I supervised PA fellow care splint applied, d/ c home.

## 2021-09-12 NOTE — ED PROVIDER NOTE - NSFOLLOWUPCLINICS_GEN_ALL_ED_FT
[facility]  Orthpedic  [address], [place]   Phone: [phone]  Fax:   Follow Up Time: Urgent

## 2022-11-18 ENCOUNTER — EMERGENCY (EMERGENCY)
Facility: HOSPITAL | Age: 62
LOS: 0 days | Discharge: LEFT BEFORE DISCHARGE | End: 2022-11-18
Attending: STUDENT IN AN ORGANIZED HEALTH CARE EDUCATION/TRAINING PROGRAM | Admitting: STUDENT IN AN ORGANIZED HEALTH CARE EDUCATION/TRAINING PROGRAM

## 2022-11-18 VITALS
RESPIRATION RATE: 22 BRPM | HEART RATE: 88 BPM | OXYGEN SATURATION: 98 % | SYSTOLIC BLOOD PRESSURE: 146 MMHG | DIASTOLIC BLOOD PRESSURE: 52 MMHG | WEIGHT: 154.98 LBS

## 2022-11-18 DIAGNOSIS — Z00.00 ENCOUNTER FOR GENERAL ADULT MEDICAL EXAMINATION WITHOUT ABNORMAL FINDINGS: ICD-10-CM

## 2022-11-18 DIAGNOSIS — R45.1 RESTLESSNESS AND AGITATION: ICD-10-CM

## 2022-11-18 DIAGNOSIS — Z53.29 PROCEDURE AND TREATMENT NOT CARRIED OUT BECAUSE OF PATIENT'S DECISION FOR OTHER REASONS: ICD-10-CM

## 2022-11-18 PROCEDURE — 99283 EMERGENCY DEPT VISIT LOW MDM: CPT

## 2022-11-18 NOTE — ED PROVIDER NOTE - CHIEF COMPLAINT
The patient is a 62y Female complaining of  The patient is a 62y Female complaining of medial evaluation

## 2022-11-18 NOTE — ED PROVIDER NOTE - PHYSICAL EXAMINATION
GENERAL: NAD   SKIN: warm, dry. No external signs of trauma   HEAD: Normocephalic; atraumatic.  EYES: PERRLA, EOMI  CARD: S1, S2 normal; no murmurs, gallops, or rubs. Regular rate and rhythm.   RESP: LCTAB; No wheezes, rales, rhonchi, or stridor.  ABD: soft, nontender, and nondistended  NEURO: Alert, oriented, grossly unremarkable. Normal gait, no focal deficits

## 2022-11-18 NOTE — ED ADULT TRIAGE NOTE - CHIEF COMPLAINT QUOTE
pt brought to ED for med eval after being escorted from visiting daughter upstairs uncooperative and agitated. pt visibly intoxicated, reports drinking "1 budweiser."

## 2022-11-18 NOTE — ED PROVIDER NOTE - NS ED ROS FT
Constitutional: No fevers, chills, or malaise.  HEENT: No headache, visual changes  Cardiac:  No chest pain, SOB, leg edema, or leg pain.  Respiratory:  No cough, respiratory distress, or hemoptysis.  GI:  No nausea, vomiting, diarrhea, or abdominal pain.  :  No dysuria, frequency, or urgency.  MS:  No myalgia, muscle weakness, joint pain or back pain.  Neuro:  No dizziness, LOC, paralysis, or N/T.  Skin:  No skin rash.   Endocrine: No polyuria, polyphagia, or polydipsia.

## 2022-11-18 NOTE — ED PROVIDER NOTE - OBJECTIVE STATEMENT
63 yo female w/ no PMH presents for medical evaluation.  Pt was called as a code grey in L&D due to being agitated, daughter in labor.  Pt then went to [facility] and threw herself on the ground.  States that she had drank a beer earlier tonight, would like to sleep in ED until she can find a way to return back to [facility] where she is from.  Has no complaints.

## 2022-11-18 NOTE — ED PROVIDER NOTE - ATTENDING CONTRIBUTION TO CARE
62-year-old female no past medical history presents with agitation.  Patient was recently called as a code gray upon labor and delivery secondary to agitation while her daughter was having a baby.  Patient then came down to hospital lobby and threw herself on the floor stating that she was not feeling well.  Patient agitated and angry, states that she had a beer earlier today and would like to sleep in the ED until she can find transport home to [place].  No acute complaints at this time.  No fever/chills, no chest pain, no shortness of breath, no trauma, no abdominal pain, no back pain, no extremity pain.    CONSTITUTIONAL: No acute distress.  SKIN: skin exam is warm and dry, no acute rash.  HEAD: Normocephalic; atraumatic.  EYES: PERRL, 3 mm bilateral, no nystagmus, EOM intact; conjunctiva and sclera clear.  ENT: No nasal discharge; airway clear.  NECK: Supple; non tender. + full passive ROM in all directions. No JVD  CARD: S1, S2 normal; no murmurs, gallops, or rubs. Regular rate and rhythm. + Symmetric Strong Pulses  RESP: No wheezes, rales or rhonchi. Good air movement bilaterally  ABD: soft; non-distended; non-tender. No Rebound, No Guarding, No signs of peritonitis, No CVA tenderness. No pulsatile abdominal mass. + Strong and Symmetric Pulses  EXT: Normal ROM. No clubbing, cyanosis or edema. Dp and Pt Pulses intact. Cap refill less than 3 seconds  NEURO: CN 2-12 intact, normal finger to nose, normal romberg, stable gait, no sensory or motor deficits, Alert, oriented, grossly unremarkable. No Focal deficits. GCS 15. NIH 0

## 2022-11-18 NOTE — ED PROVIDER NOTE - CLINICAL SUMMARY MEDICAL DECISION MAKING FREE TEXT BOX
I personally evaluated the patient. I reviewed the Resident´s or Physician Assistant´s note (as assigned above), and agree with the findings and plan except as documented in my note.  Patient evaluated for agitation.  Patient initially agitated, able to be de-escalated verbally.  Patient fell asleep in the ED, no complaints.  Ambulatory with steady gait in the ED.  No suicidal or homicidal ideation, no A/V hallucinations.  Patient eloped prior to continuation evaluation and treatment.

## 2022-11-18 NOTE — ED ADULT NURSE NOTE - NSIMPLEMENTINTERV_GEN_ALL_ED
Implemented All Universal Safety Interventions:  [place] to call system. Call bell, personal items and telephone within reach. Instruct patient to call for assistance. Room bathroom lighting operational. Non-slip footwear when patient is off stretcher. Physically safe environment: no spills, clutter or unnecessary equipment. Stretcher in lowest position, wheels locked, appropriate side rails in place.

## 2025-04-21 NOTE — ED ADULT TRIAGE NOTE - WEIGHT IN KG
No protocol for requested medication.    Medication:     Disp Refills Start End     sertraline (ZOLOFT) 100 MG tablet 60 tablet 1 2/17/2025 --    Sig - Route: Take 2 tablets by mouth daily. - Oral      Last office visit date: 3-10-25  Pharmacy: [id number] - [place], [place] - [address]    Order pended, routed to clinician for review.     Recommended Follow Up: 4 months  No Show/Cancel: none  Next visit: 7/21/2025      
72.6